# Patient Record
Sex: MALE | Race: WHITE
[De-identification: names, ages, dates, MRNs, and addresses within clinical notes are randomized per-mention and may not be internally consistent; named-entity substitution may affect disease eponyms.]

---

## 2020-07-22 ENCOUNTER — HOSPITAL ENCOUNTER (EMERGENCY)
Dept: HOSPITAL 56 - MW.ED | Age: 85
LOS: 1 days | Discharge: NURSING, REHAB OR LONG-TERM CARE | End: 2020-07-23
Payer: MEDICARE

## 2020-07-22 VITALS — SYSTOLIC BLOOD PRESSURE: 184 MMHG | DIASTOLIC BLOOD PRESSURE: 102 MMHG | HEART RATE: 68 BPM

## 2020-07-22 DIAGNOSIS — Z20.828: ICD-10-CM

## 2020-07-22 DIAGNOSIS — Z88.8: ICD-10-CM

## 2020-07-22 DIAGNOSIS — R00.0: ICD-10-CM

## 2020-07-22 DIAGNOSIS — Z79.899: ICD-10-CM

## 2020-07-22 DIAGNOSIS — E03.9: ICD-10-CM

## 2020-07-22 DIAGNOSIS — F41.9: ICD-10-CM

## 2020-07-22 DIAGNOSIS — R79.89: ICD-10-CM

## 2020-07-22 DIAGNOSIS — K40.90: ICD-10-CM

## 2020-07-22 DIAGNOSIS — Z79.82: ICD-10-CM

## 2020-07-22 DIAGNOSIS — I10: ICD-10-CM

## 2020-07-22 DIAGNOSIS — F32.9: ICD-10-CM

## 2020-07-22 DIAGNOSIS — K56.609: Primary | ICD-10-CM

## 2020-07-22 LAB
BUN SERPL-MCNC: 45 MG/DL (ref 7–18)
CHLORIDE SERPL-SCNC: 102 MMOL/L (ref 98–107)
CO2 SERPL-SCNC: 26.3 MMOL/L (ref 21–32)
GLUCOSE SERPL-MCNC: 158 MG/DL (ref 74–106)
LIPASE SERPL-CCNC: 91 U/L (ref 73–393)
POTASSIUM SERPL-SCNC: 3.1 MMOL/L (ref 3.5–5.1)
SODIUM SERPL-SCNC: 143 MMOL/L (ref 136–148)

## 2020-07-22 PROCEDURE — 93005 ELECTROCARDIOGRAM TRACING: CPT

## 2020-07-22 PROCEDURE — 71045 X-RAY EXAM CHEST 1 VIEW: CPT

## 2020-07-22 PROCEDURE — 99285 EMERGENCY DEPT VISIT HI MDM: CPT

## 2020-07-22 PROCEDURE — U0002 COVID-19 LAB TEST NON-CDC: HCPCS

## 2020-07-22 PROCEDURE — 83690 ASSAY OF LIPASE: CPT

## 2020-07-22 PROCEDURE — 96376 TX/PRO/DX INJ SAME DRUG ADON: CPT

## 2020-07-22 PROCEDURE — 85025 COMPLETE CBC W/AUTO DIFF WBC: CPT

## 2020-07-22 PROCEDURE — 43752 NASAL/OROGASTRIC W/TUBE PLMT: CPT

## 2020-07-22 PROCEDURE — 96375 TX/PRO/DX INJ NEW DRUG ADDON: CPT

## 2020-07-22 PROCEDURE — 81001 URINALYSIS AUTO W/SCOPE: CPT

## 2020-07-22 PROCEDURE — 84484 ASSAY OF TROPONIN QUANT: CPT

## 2020-07-22 PROCEDURE — 80053 COMPREHEN METABOLIC PANEL: CPT

## 2020-07-22 PROCEDURE — 96365 THER/PROPH/DIAG IV INF INIT: CPT

## 2020-07-22 PROCEDURE — 51702 INSERT TEMP BLADDER CATH: CPT

## 2020-07-22 PROCEDURE — 96361 HYDRATE IV INFUSION ADD-ON: CPT

## 2020-07-22 PROCEDURE — 74177 CT ABD & PELVIS W/CONTRAST: CPT

## 2020-07-22 PROCEDURE — 83605 ASSAY OF LACTIC ACID: CPT

## 2020-07-22 NOTE — CR
Indication:



 Intubation 



Technique:



Chest 1 view



Comparison:



July 22, 2020 at 10:09 p.m.



Findings/Impression:



Endotracheal tube tip terminates at the level of the hosea and should be 

retracted. A gastric tube tip terminates at the level of mid stomach. There 

are multiple air-filled loops of colon in the upper abdomen. No free air is 

identified. Stable cardiac size. Status post median sternotomy. Lung 

volumes are low. Calcified granulomata in the right lung. No pneumothorax 

or significant effusion.



Dictated by Karon Ralph MD @ Jul 22 2020 11:04PM



Signed by Dr. Karon Ralph @ Jul 22 2020 11:05PM

## 2020-07-22 NOTE — EDM.PDOC
ED HPI GENERAL MEDICAL PROBLEM





- General


Chief Complaint: Gastrointestinal Problem


Stated Complaint: abdominal distention


Time Seen by Provider: 07/22/20 20:15





- History of Present Illness


INITIAL COMMENTS - FREE TEXT/NARRATIVE: 





History of present illness:


Patient presents with a distended abdomen since yesterday.  Patient comes from a

nursing home we had very little report from the patient is complaining of 

feeling very bloated and uncomfortable with abdominal pain he did have some 

vomiting apparently earlier he also has noted when we get into the room to be 

having runs of V. tach on the monitor he states he has a hernia that had given 

him trouble in the past he has Parkinson's and has dementia and is a difficult 

historian although he is alert oriented to person and place and able to answer 

questions.





Review of systems: 


As per history of present illness and below otherwise all systems reviewed and 

negative.





Past medical history: 


As per history of present illness and as reviewed below otherwise 

noncontributory.





Surgical history: 


As per history of present illness and as reviewed below otherwise 

noncontributory.





Social history: 


No reported history of drug or alcohol abuse.





Family history: 


As per history of present illness and as reviewed below otherwise 

noncontributory.





Physical exam:


HEENT: Atraumatic, normocephalic, pupils reactive, negative for conjunctival 

pallor or scleral icterus, mucous membranes moist, throat clear, neck supple, 

nontender, trachea midline.


Lungs: Clear to auscultation, breath sounds equal bilaterally, chest nontender.


Heart: S1S2, regular, negative for clicks, rubs, or JVD.


Abdomen: Very distended tympanic abdomen consistent with a high-grade bowel 

obstruction.  It is diffusely tender to palpation.. Negative for masses or 

hepatosplenomegaly. Negative for costovertebral tenderness.


Pelvis: Stable nontender.


Genitourinary: There is a right inguinal hernia present


Rectal: Deferred.


Extremities: Atraumatic, negative for cords or calf pain. Neurovascular 

unremarkable.


Neuro: Awake, alert, oriented. Cranial nerves II through XII unremarkable. 

Cerebellum unremarkable. Motor and sensory unremarkable throughout. Exam 

nonfocal.





Diagnostics:


[]





Therapeutics:


[]





Impression: Bowel obstruction, tachydysrhythmia


[]





Plan: Patient will undergo lab studies a CT down and pelvis manipulation of the 

hernia will be attempted medication for pain and stabilization of his ta

chydysrhythmia will be done including a cardiac work-up.


[]





Definitive disposition and diagnosis as appropriate pending reevaluation and 

review of above.





  ** Abdomen


Pain Score (Numeric/FACES): 2





- Related Data


                                    Allergies











Allergy/AdvReac Type Severity Reaction Status Date / Time


 


atorvastatin Allergy  Other Verified 07/22/20 18:44


 


fluticasone [From Flonase] Allergy  Other Verified 07/22/20 18:44


 


fluvastatin Allergy  Other Verified 07/22/20 18:44


 


niacin Allergy  Muscle Verified 07/22/20 18:44





   Weakness  


 


simvastatin Allergy  Chest Pain Verified 07/22/20 18:44


 


tamsulosin Allergy  Other Verified 07/22/20 18:44











Home Meds: 


                                    Home Meds





Latanoprost [Xalatan 0.005% Ophth Soln] 1 drop EYEBOTH BEDTIME 12/22/16 

[History]


Lisinopril 40 mg PO DAILY 12/22/16 [History]


Metoprolol Tartrate 12.5 mg PO BID 12/22/16 [History]


Multivitamin [Multivitamins] 1 each PO DAILY 12/22/16 [History]


Nitroglycerin 9 mg PO Q12HR 12/22/16 [History]


Nitroglycerin [Nitrostat] 0.4 mg SL Q5M PRN MDD 3 TABLETS 12/22/16 [History]


amLODIPine Besylate [Amlodipine Besylate] 5 mg PO DAILY 12/22/16 [History]


Lovastatin 20 mg PO BEDTIME 12/24/16 [History]


Acetaminophen [Pain Reliever] 500 mg PO Q4H PRN 02/17/17 [History]


Aspirin [Ecotrin EC] 81 mg PO DAILY 02/17/17 [History]


Bisacodyl [Biscolax] 10 mg RC Q24H PRN 02/17/17 [History]


Calcium Carbonate/Vitamin D3 [Calcium 600 + Vit D Tablet] 1 tab PO DAILY 

02/17/17 [History]


Carboxymethylcellulose Sodium [Refresh Tears] 1 drop EYEBOTH QID PRN 02/17/17 

[History]


Finasteride 5 mg PO BEDTIME 02/17/17 [History]


Levothyroxine 37.5 mcg PO ACBREAKFAST 02/17/17 [History]


Mag Hydrox/Aluminum Hyd/Simeth [Maalox Maximum Strength Susp] 30 ml PO 

ASDIRECTED PRN 02/17/17 [History]


Magnesium Hydroxide [Milk of Magnesia] 30 ml PO DAILY PRN 02/17/17 [History]


Na Phos,M-B/Na Phos,DI-B [Fleet Enema] 133 ml RC ASDIRECTED PRN 02/17/17 

[History]


Phenyleph/Shark Effie.oil/Mo/Pet [Hemorrhoidal Ointment] 1 applic RC ASDIRECTED 

PRN 02/17/17 [History]


Psyllium Husk [Metamucil] 2 tbsp PO BID 02/17/17 [History]


Sennosides [Senna] 8.6 mg PO BID 02/17/17 [History]


polyethylene glycoL 3350 [Miralax] 17 gm PO DAILY 02/17/17 [History]


witch hazeL [Tucks] 1 each TP ASDIRECTED PRN 02/17/17 [History]











Past Medical History


HEENT History: Reports: Glaucoma


Cardiovascular History: Reports: Hypertension, Stents


Respiratory History: Reports: None


Gastrointestinal History: Reports: Chronic Constipation


Genitourinary History: Reports: Prostate Disorder


Musculoskeletal History: Reports: None


Neurological History: Reports: Other (See Below)


Other Neuro History: Confusion


Psychiatric History: Reports: Anxiety, Depression


Endocrine/Metabolic History: Reports: Hypothyroidism


Hematologic History: Reports: None


Oncologic (Cancer) History: Reports: Prostate


Dermatologic History: Reports: None





- Infectious Disease History


Infectious Disease History: Reports: None





- Past Surgical History


Cardiovascular Surgical History: Reports: Coronary Artery Bypass





Social & Family History





- Family History


Family Medical History: Unobtainable





- Tobacco Use


Smoking Status *Q: Never Smoker


Second Hand Smoke Exposure: No





- Caffeine Use


Caffeine Use: Reports: None





- Recreational Drug Use


Recreational Drug Use: No





ED ROS GENERAL





- Review of Systems


Review Of Systems: See Below





ED EXAM, GENERAL





- Physical Exam


Exam: See Below





EKG INTERPRETATION


EKG Interpretation Comments: 





Normal sinus rhythm rate of 80 bpm wide-complex QRS with ventricular trigeminy 

nonspecific ST-T changes grossly abnormal EKG. there are several areas of 

inconsistent ST segment elevation but no clear reciprocal change





Course





- Vital Signs


Text/Narrative:: 





Patient was initially brought back after being in the waiting room for some time

 on quick examination it was noted when nursing called me and that he was having

 runs of V. tach.  We initiated a bolus dose of amiodarone and this seemed to 

stabilize.  On gross exam it was noted that he had a widely distended abdomen 

orders for pain medication and NG tube were obtained a CT of the abdomen was 

obtained owing a left inguinal hernia as a cause of high-grade bowel 

obstruction.  Patient was soon manipulated and the hernia was palpably reduced 

however the abdomen did not significantly reduce and distention immediately.





I discussed the complications that the patient was having with his sister who is

 power of  as well as with the patient and they would like intervention 

if necessary for both the heart condition and the potential surgical problem 

with his bowel obstruction.





Discussed the case with Dr. Pollack at approximately 10:30 PM I then subsequently 

discussed the case with Dr. Cobb at 10:40 PM and they agree that with these 

complications the patient should be transferred to [facility].





At 11 PM this case with Dr. Foreman at [facility] ED they will accept the patient.  





Critical care 38 minutes for acute bowel obstruction with unstable vital signs 

and abnormal ventricular dysrhythmias.  This included initial assessment 

initiation of antidysrhythmic's also included reassessment of the patient fluid 

resuscitation maintenance fluids reassessment of patient multiple conversations 

with consultants and family members does not include separately billable 

procedures.








Last Recorded V/S: 


                                Last Vital Signs











Temp  37.2 C   07/22/20 18:38


 


Pulse  80   07/22/20 18:38


 


Resp  20   07/22/20 18:38


 


BP  153/91 H  07/22/20 18:38


 


Pulse Ox  94 L  07/22/20 18:38














- Orders/Labs/Meds


Orders: 


                               Active Orders 24 hr











 Category Date Time Status


 


 EKG 12 Lead [EKG Documentation Completion] [RC] STAT Care  07/22/20 19:12 

Active


 


 Gastrointestinal Tube Mgmt [RC] ASDIRECTED Care  07/22/20 20:23 Active


 


 CORONAVIRUS COVID-19 RAPID [MOLEC] Stat Lab  07/22/20 22:39 Received


 


 UA RFX JEFF AND CULT IF INDIC [URIN] Stat Lab  07/22/20 19:11 Ordered


 


 Amiodarone In Dextrose,Iso-Osm [Nexterone in Dextrose Med  07/22/20 23:15 

Active





 360 MG/200 ML]   





 360 mg in 200 ml IV ASDIRECTED   


 


 Aspirin Med  07/22/20 23:15 Active





 300 mg RECTAL DAILY   


 


 Sodium Chloride 0.9% [Normal Saline] 1,000 ml Med  07/22/20 23:15 Active





 IV ASDIRECTED   


 


 Sodium Chloride 0.9% [Saline Flush] Med  07/22/20 19:11 Active





 10 ml FLUSH ASDIRECTED PRN   


 


 Sodium Chloride 0.9% [Saline Flush] [facility]  07/22/20 19:11 Active





 2.5 ml FLUSH ASDIRECTED PRN   


 


 Sodium Chloride 0.9% [Saline Flush] [facility]  07/22/20 19:11 Active





 2.5 ml FLUSH ASDIRECTED PRN   


 


 NG [Nasogastric Orogastric Tube Insertion] [OM.PC] Stat Ot  07/22/20 20:22 

Ordered


 


 Saline Lock Insert [OM.PC] Stat [facility]  07/22/20 19:11 Ordered








                                Medication Orders





Aspirin (Aspirin)  300 mg RECTAL DAILY CRICKET


Amiodarone HCl/Dextrose (Nexterone In Dextrose 360 Mg/200 Ml)  360 mg in 200 mls

 @ 33.333 mls/hr IV ASDIRECTED CRICKET; Protocol


Sodium Chloride (Normal Saline)  1,000 mls @ 100 mls/hr IV ASDIRECTED CRICKET


Sodium Chloride (Saline Flush)  2.5 ml FLUSH ASDIRECTED PRN


   PRN Reason: Keep Vein Open


Sodium Chloride (Saline Flush)  10 ml FLUSH ASDIRECTED PRN


   PRN Reason: Keep Vein Open


Sodium Chloride (Saline Flush)  2.5 ml FLUSH ASDIRECTED PRN


   PRN Reason: Keep Vein Open








Labs: 


                                Laboratory Tests











  07/22/20 07/22/20 07/22/20 Range/Units





  19:50 19:50 22:52 


 


WBC  11.20 H    (4.0-11.0)  K/uL


 


RBC  4.45 L    (4.50-5.90)  M/uL


 


Hgb  13.7    (13.0-17.0)  g/dL


 


Hct  41.0    (38.0-50.0)  %


 


MCV  92.1    (80.0-98.0)  fL


 


MCH  30.8    (27.0-32.0)  pg


 


MCHC  33.4    (31.0-37.0)  g/dL


 


RDW Std Deviation  43.2    (28.0-62.0)  fl


 


RDW Coeff of Yinka  13    (11.0-15.0)  %


 


Plt Count  162    (150-400)  K/uL


 


MPV  10.80    (7.40-12.00)  fL


 


Neut % (Auto)  87.1 H    (48.0-80.0)  %


 


Lymph % (Auto)  6.1 L    (16.0-40.0)  %


 


Mono % (Auto)  6.8    (0.0-15.0)  %


 


Eos % (Auto)  0.0    (0.0-7.0)  %


 


Baso % (Auto)  0.0    (0.0-1.5)  %


 


Neut # (Auto)  9.8 H    (1.4-5.7)  K/uL


 


Lymph # (Auto)  0.7    (0.6-2.4)  K/uL


 


Mono # (Auto)  0.8    (0.0-0.8)  K/uL


 


Eos # (Auto)  0.0    (0.0-0.7)  K/uL


 


Baso # (Auto)  0.0    (0.0-0.1)  K/uL


 


Nucleated RBC %  0.0    /100WBC


 


Nucleated RBCs #  0    K/uL


 


Lactate    1.9  (0.20-2.00)  mmol/L


 


Sodium   143   (136-148)  mmol/L


 


Potassium   3.1 L   (3.5-5.1)  mmol/L


 


Chloride   102   ()  mmol/L


 


Carbon Dioxide   26.3   (21.0-32.0)  mmol/L


 


BUN   45 H   (7.0-18.0)  mg/dL


 


Creatinine   1.1   (0.8-1.3)  mg/dL


 


Est Cr Clr Drug Dosing   38.30   mL/min


 


Estimated GFR (MDRD)   > 60.0   ml/min


 


Glucose   158 H   ()  mg/dL


 


Calcium   11.7 H   (8.5-10.1)  mg/dL


 


Total Bilirubin   1.8 H   (0.2-1.0)  mg/dL


 


AST   39 H   (15-37)  IU/L


 


ALT   28   (14-63)  IU/L


 


Alkaline Phosphatase   94   ()  U/L


 


Troponin I   0.154 H*   (0.000-0.056)  ng/mL


 


Total Protein   8.1   (6.4-8.2)  g/dL


 


Albumin   5.0   (3.4-5.0)  g/dL


 


Globulin   3.1   (2.6-4.0)  g/dL


 


Albumin/Globulin Ratio   1.6   (0.9-1.6)  


 


Lipase   91   ()  U/L











Meds: 


Medications











Generic Name Dose Route Start Last Admin





  Trade Name Freq  PRN Reason Stop Dose Admin


 


Aspirin  300 mg  07/22/20 23:15 





  Aspirin  RECTAL  





  DAILY CRICKET  


 


Amiodarone HCl/Dextrose  360 mg in 200 mls @ 33.333 mls/hr  07/22/20 23:15 





  Nexterone In Dextrose 360 Mg/200 Ml  IV  





  ASDIRECTED CRICKET  





  Protocol  





  1 MG/MIN  


 


Sodium Chloride  1,000 mls @ 100 mls/hr  07/22/20 23:15 





  Normal Saline  IV  





  ASDIRECTED CRICKET  


 


Sodium Chloride  2.5 ml  07/22/20 19:11 





  Saline Flush  FLUSH  





  ASDIRECTED PRN  





  Keep Vein Open  


 


Sodium Chloride  10 ml  07/22/20 19:11 





  Saline Flush  FLUSH  





  ASDIRECTED PRN  





  Keep Vein Open  


 


Sodium Chloride  2.5 ml  07/22/20 19:11 





  Saline Flush  FLUSH  





  ASDIRECTED PRN  





  Keep Vein Open  














Discontinued Medications














Generic Name Dose Route Start Last Admin





  Trade Name Suzette  PRN Reason Stop Dose Admin


 


Aspirin  Confirm  07/22/20 23:11 





  Aspirin  Administered  07/22/20 23:12 





  Dose  





  300 mg  





  .ROUTE  





  .STK-MED ONE  


 


Benzocaine  Confirm  07/22/20 21:42 





  Hurricaine One 20%  Administered  07/22/20 21:43 





  Dose  





  2 each  





  MUCMEM  





  .STK-MED ONE  


 


Sodium Chloride  1,000 mls @ 999 mls/hr  07/22/20 19:11  07/22/20 20:09





  Normal Saline  IV  07/22/20 20:11  999 mls/hr





  BOLUS ONE   Administration


 


Amiodarone HCl 150 mg/  103 mls @ 600 mls/hr  07/22/20 20:19 





  Dextrose/Water  IV  07/22/20 20:29 





  .BOLUS ONE  


 


Iopamidol  100 ml  07/22/20 21:44  07/22/20 21:45





  Isovue-370 (76%)  IVPUSH  07/22/20 21:45  100 ml





  ONETIME STA   Administration


 


Lidocaine HCl  10 ml  07/22/20 20:24 





  Xylocaine 4% Top Soln  MUCMEM  07/22/20 20:25 





  ONETIME ONE  


 


Lidocaine HCl  Confirm  07/22/20 21:49 





  Xylocaine 2% Viscous  Administered  07/22/20 21:50 





  Dose  





  15 ml  





  .ROUTE  





  .STK-MED ONE  


 


Morphine Sulfate  4 mg  07/22/20 20:22  07/22/20 20:37





  Morphine  IVPUSH  07/22/20 20:23  4 mg





  ONETIME ONE   Administration


 


Morphine Sulfate  Confirm  07/22/20 20:23  07/22/20 20:37





  Morphine  Administered  07/22/20 20:24  Not Given





  Dose  





  4 mg  





  .ROUTE  





  .STK-MED ONE  


 


Ondansetron HCl  4 mg  07/22/20 19:13  07/22/20 20:09





  Zofran  IVPUSH  07/22/20 19:14  4 mg





  ONETIME ONE   Administration














Departure





- Departure


Time of Disposition: 23:21


Disposition: DC[facility] to [facility]


Condition: Fair


Clinical Impression: 


 Bowel obstruction, Inguinal hernia, Ventricular tachycardia, Elevated troponin








- Discharge Information


*PRESCRIPTION DRUG MONITORING PROGRAM REVIEWED*: Not Applicable


*COPY OF PRESCRIPTION DRUG MONITORING REPORT IN PATIENT [place]N: Not Applicable


Instructions:  Inguinal Hernia, Adult, Easy-to-Read, Bowel Obstruction, 

Easy-to-Read


Referrals: 


PCP,None [Primary Care Provider] - 


Forms:  ED Department Discharge





Sepsis Event Note (ED)





- Evaluation


Sepsis Screening Result: No Definite Risk





- Focused Exam


Vital Signs: 


                                   Vital Signs











  Temp Pulse Resp BP Pulse Ox


 


 07/22/20 18:38  37.2 C  80  20  153/91 H  94 L














- My Orders


Last 24 Hours: 


My Active Orders





07/22/20 19:11


UA RFX JEFF AND CULT IF INDIC [URIN] Stat 


Sodium Chloride 0.9% [Saline Flush]   10 ml FLUSH ASDIRECTED PRN 


Sodium Chloride 0.9% [Saline Flush]   2.5 ml FLUSH ASDIRECTED PRN 


Sodium Chloride 0.9% [Saline Flush]   2.5 ml FLUSH ASDIRECTED PRN 


Saline Lock Insert [OM.PC] Stat 





07/22/20 19:12


EKG 12 Lead [EKG Documentation Completion] [RC] STAT 





07/22/20 20:22


NG [Nasogastric Orogastric Tube Insertion] [OM.PC] Stat 





07/22/20 20:23


Gastrointestinal Tube Mgmt [RC] ASDIRECTED 





07/22/20 22:39


CORONAVIRUS COVID-19 RAPID [MOLEC] Stat 





07/22/20 23:15


Amiodarone In Dextrose,Iso-Osm [Nexterone in Dextrose 360 MG/200 ML] 360 mg in 

200 ml IV ASDIRECTED 


Aspirin   300 mg RECTAL DAILY 


Sodium Chloride 0.9% [Normal Saline] 1,000 ml IV ASDIRECTED 














- Assessment/Plan


Last 24 Hours: 


My Active Orders





07/22/20 19:11


UA RFX JEFF AND CULT IF INDIC [URIN] Stat 


Sodium Chloride 0.9% [Saline Flush]   10 ml FLUSH ASDIRECTED PRN 


Sodium Chloride 0.9% [Saline Flush]   2.5 ml FLUSH ASDIRECTED PRN 


Sodium Chloride 0.9% [Saline Flush]   2.5 ml FLUSH ASDIRECTED PRN 


Saline Lock Insert [OM.PC] Stat 





07/22/20 19:12


EKG 12 Lead [EKG Documentation Completion] [RC] STAT 





07/22/20 20:22


NG [Nasogastric Orogastric Tube Insertion] [OM.PC] Stat 





07/22/20 20:23


Gastrointestinal Tube Mgmt [RC] ASDIRECTED 





07/22/20 22:39


CORONAVIRUS COVID-19 RAPID [MOLEC] Stat 





07/22/20 23:15


Amiodarone In Dextrose,Iso-Osm [Nexterone in Dextrose 360 MG/200 ML] 360 mg in 

200 ml IV ASDIRECTED 


Aspirin   300 mg RECTAL DAILY 


Sodium Chloride 0.9% [Normal Saline] 1,000 ml IV ASDIRECTED

## 2020-07-22 NOTE — CT
INDICATION:



Abdominal pain. 



COMPARISON:



None available 



TECHNIQUE:



CT examination of the abdomen and pelvis was performed with the uneventful 

intravenous administration of 100 cc of Isovue 370 while 3 mm thick axial 

sections were obtained from the lung bases through the pubic symphysis. 

Oral contrast was not administered.  



Please note that all CT scans at this facility use dose modulation, 

iterative reconstruction, and/or weight-based dosing when appropriate to 

reduce radiation dose to as low as reasonably achievable. 



FINDINGS:



There is massive dilatation of the colon extending to a moderate sized left 

inguinal hernia containing a nondistended loop of sigmoid colon. The colon 

proximal to the hernia is prominently distended, with the sigmoid colon 

distal to the hernia collapsed. The findings are that high grade distal 

colonic obstruction by the hernia. 



There is no sign of free air or free fluid in the abdomen or pelvis to 

suggest perforation. 



Interestingly, the small bowel is nondistended. 



There is also a small right inguinal hernia containing fluid and fat, but 

no loops of bowel. 



In the abdomen, the liver has a 1.4 centimeter anterior subcapsular cyst in 

the inferior lateral segment of the left lobe, segment 3. The rest of the 

liver is normal in appearance.



The spleen, pancreas, and adrenals are normal in appearance. 



A few small cysts are present in both kidneys. The kidneys are otherwise 

normal in appearance. 



The gallbladder appears to be absent, consistent with cholecystectomy. 



The abdominal aorta is normal in caliber with no sign of dilatation. There 

is no sign of retroperitoneal mass or adenopathy. 



The stomach, loops of small bowel, and colon in the abdomen are normal in 

appearance. 



In the pelvis, the appendix is nonvisualized, but there is no sign of an 

inflammatory process in the area of the appendix.



The loops of small bowel in the pelvis are normal in appearance. 



The prostate is normal in appearance. 



The urinary bladder is normal in appearance. 



There is no sign of pelvic or inguinal mass or adenopathy. 



There is moderate consolidation of the posterior lung bases associated with 

moderate eventration of both hemidiaphragms. 



There is a densely calcified granuloma in the anterior-medial subpleural 

right middle lobe at the lung base.



The heart is mildly enlarged. Sternal wires are present from median 

sternotomy. There is heavy triple-vessel coronary calcification. 



There is mild scoliosis of the lumbar spine convex towards the left.



There is mild anterior wedging of T11 through L1, mild compression 

fractures versus anatomic variant. There is moderate L4-5 and prominent 

L5-S1 disc degenerative disease. There is moderate L3-4 disc degenerative 

disease as well. There is moderate disc degenerative disease throughout the 

inferior thoracic spine. 



IMPRESSION:



Findings of high-grade distal colonic obstruction produced by a 

moderate-sized left inguinal hernia containing a nondistended loop of 

sigmoid colon. The distal sigmoid colon is nondistended. 



Interestingly, the small bowel is nondistended.



CT of the abdomen shows absence of the gallbladder consistent with 

cholecystectomy.



CT of the pelvis shows a small right inguinal hernia containing fat and 

fluid with no loops of bowel. 



Moderate posterior basilar atelectasis associated with moderate eventration 

of both hemidiaphragms. 



Mild cardiomegaly.



Please note that all CT scans at this facility use dose modulation, 

iterative reconstruction, and/or weight-based dosing when appropriate to 

reduce radiation dose to as low as reasonably achievable.



Dictated by Marshal Armstrong MD @ Jul 22 2020 10:18PM



Signed by Dr. Marshal Armstrong @ Jul 22 2020 10:29PM

## 2020-07-22 NOTE — CR
INDICATION:



NG tube placement



TECHNIQUE:



Chest 2 views.



COMPARISON:



None 



FINDINGS:



The heart is normal in size. The pulmonary vasculature is within normal 

limits. An enteric tube tip projects over the right mainstem bronchus. 

There are postsurgical changes of median sternotomy. Marked dilated loops 

of bowel are seen within the partially visualized upper abdomen. 



IMPRESSION:



Enteric tube within the right mainstem bronchus. Marked dilated loops of 

bowel partially visualized within the upper abdomen. 



Findings were discussed with Dr. Cotton at 10:35 p.m.  on 07/22/2020. The 

enteric tube has already been removed.



Dictated by Alicia Woodward MD @ 7/22/2020 10:35:57 PM



Dictated by: Alicia Woodward MD @ 07/22/2020 22:37:30



(Electronically Signed)

## 2020-09-24 ENCOUNTER — HOSPITAL ENCOUNTER (EMERGENCY)
Dept: HOSPITAL 56 - MW.ED | Age: 85
LOS: 1 days | Discharge: NURSING, REHAB OR LONG-TERM CARE | End: 2020-09-25
Payer: MEDICARE

## 2020-09-24 DIAGNOSIS — Z20.828: ICD-10-CM

## 2020-09-24 DIAGNOSIS — K56.2: Primary | ICD-10-CM

## 2020-09-24 DIAGNOSIS — Z79.899: ICD-10-CM

## 2020-09-24 DIAGNOSIS — Z88.8: ICD-10-CM

## 2020-09-24 DIAGNOSIS — Z79.02: ICD-10-CM

## 2020-09-24 DIAGNOSIS — I10: ICD-10-CM

## 2020-09-24 DIAGNOSIS — Z79.82: ICD-10-CM

## 2020-09-24 DIAGNOSIS — E03.9: ICD-10-CM

## 2020-09-24 LAB
BUN SERPL-MCNC: 26 MG/DL (ref 7–18)
CHLORIDE SERPL-SCNC: 103 MMOL/L (ref 98–107)
CO2 SERPL-SCNC: 27.4 MMOL/L (ref 21–32)
GLUCOSE SERPL-MCNC: 124 MG/DL (ref 74–106)
LIPASE SERPL-CCNC: 98 U/L (ref 73–393)
POTASSIUM SERPL-SCNC: 3.2 MMOL/L (ref 3.5–5.1)
SODIUM SERPL-SCNC: 140 MMOL/L (ref 136–148)

## 2020-09-24 PROCEDURE — 96374 THER/PROPH/DIAG INJ IV PUSH: CPT

## 2020-09-24 PROCEDURE — 74177 CT ABD & PELVIS W/CONTRAST: CPT

## 2020-09-24 PROCEDURE — 83690 ASSAY OF LIPASE: CPT

## 2020-09-24 PROCEDURE — 83605 ASSAY OF LACTIC ACID: CPT

## 2020-09-24 PROCEDURE — 83735 ASSAY OF MAGNESIUM: CPT

## 2020-09-24 PROCEDURE — 99284 EMERGENCY DEPT VISIT MOD MDM: CPT

## 2020-09-24 PROCEDURE — 85025 COMPLETE CBC W/AUTO DIFF WBC: CPT

## 2020-09-24 PROCEDURE — 80053 COMPREHEN METABOLIC PANEL: CPT

## 2020-09-24 PROCEDURE — U0002 COVID-19 LAB TEST NON-CDC: HCPCS

## 2020-09-24 PROCEDURE — 36415 COLL VENOUS BLD VENIPUNCTURE: CPT

## 2020-09-24 PROCEDURE — 96375 TX/PRO/DX INJ NEW DRUG ADDON: CPT

## 2020-09-24 NOTE — EDM.PDOC
ED HPI GENERAL MEDICAL PROBLEM





- General


Chief Complaint: Abdominal Pain


Stated Complaint: REFER FROM JARET


Time Seen by Provider: 09/24/20 20:51





- History of Present Illness


INITIAL COMMENTS - FREE TEXT/NARRATIVE: 





85-year-old male presenting with abdominal pain and distention.  The patient 

does have a history of a right inguinal hernia that led to a large bowel 

obstruction in July of this year.  At that time the patient also had runs of 

ventricular tachycardia and he was loaded with amiodarone and transferred to 

[facility].  His sister and POA is at the bedside and she reports 

that they did a colonoscopy but that he did not have an abdominal surgery.  On 

chart review it appears that the inguinal hernia was reduced in the ER prior to 

transfer.





Patient is now complaining of abdominal pain he has significant dementia so 

difficult to determine how long this is been going on but his sister reports 

that when she last talked to him 2 days ago everything seemed fine.  He denies 

fever or complaints he denies exacerbating or alleviating factors or other 

associated symptoms.


  ** abdominal


Pain Score (Numeric/FACES): 5





- Related Data


                                    Allergies











Allergy/AdvReac Type Severity Reaction Status Date / Time


 


atorvastatin Allergy  Other Verified 07/22/20 18:44


 


fluticasone [From Flonase] Allergy  Other Verified 07/22/20 18:44


 


fluvastatin Allergy  Other Verified 07/22/20 18:44


 


niacin Allergy  Muscle Verified 07/22/20 18:44





   Weakness  


 


simvastatin Allergy  Chest Pain Verified 07/22/20 18:44


 


tamsulosin Allergy  Other Verified 07/22/20 18:44











Home Meds: 


                                    Home Meds





Latanoprost [Xalatan 0.005% Ophth Soln] 1 drop EYEBOTH BEDTIME 12/22/16 

[History]


Lisinopril 40 mg PO DAILY 12/22/16 [History]


Metoprolol Tartrate 12.5 mg PO BID 12/22/16 [History]


Multivitamin [Multivitamins] 1 each PO DAILY 12/22/16 [History]


Nitroglycerin 9 mg PO Q12HR 12/22/16 [History]


Nitroglycerin [Nitrostat] 0.4 mg SL Q5M PRN MDD 3 TABLETS 12/22/16 [History]


amLODIPine Besylate [Amlodipine Besylate] 5 mg PO DAILY 12/22/16 [History]


Lovastatin 40 mg PO BEDTIME 12/24/16 [History]


Acetaminophen [Pain Reliever] 500 mg PO Q4H PRN 02/17/17 [History]


Aspirin [Ecotrin EC] 81 mg PO DAILY 02/17/17 [History]


Bisacodyl [Biscolax] 10 mg RC Q24H PRN 02/17/17 [History]


Calcium Carbonate/Vitamin D3 [Calcium 600 + Vit D Tablet] 1 tab PO DAILY 

02/17/17 [History]


Carboxymethylcellulose Sodium [Refresh Tears] 1 drop EYEBOTH QID PRN 02/17/17 

[History]


Finasteride 5 mg PO BEDTIME 02/17/17 [History]


Levothyroxine 37.5 mcg PO ACBREAKFAST 02/17/17 [History]


Mag Hydrox/Aluminum Hyd/Simeth [Maalox Maximum Strength Susp] 30 ml PO 

ASDIRECTED PRN 02/17/17 [History]


Magnesium Hydroxide [Milk of Magnesia] 30 ml PO DAILY PRN 02/17/17 [History]


Na Phos,M-B/Na Phos,DI-B [Fleet Enema] 133 ml RC ASDIRECTED PRN 02/17/17 

[History]


Psyllium Husk [Metamucil] 2 tbsp PO BID 02/17/17 [History]


polyethylene glycoL 3350 [Miralax] 17 gm PO DAILY 02/17/17 [History]


witch hazeL [Tucks] 1 each TP ASDIRECTED PRN 02/17/17 [History]


Carbamide Peroxide [Debrox] 1 drop EARBOTH ASDIRECTED PRN 09/24/20 [History]


Clopidogrel Bisulfate [Plavix] 75 mg PO DAILY 09/24/20 [History]


Donepezil HCl 10 mg PO DAILY 09/24/20 [History]


Loperamide [Imodium] 2 mg PO ASDIRECTED PRN 09/24/20 [History]


Non-Formulary Medication [NF Drug]  09/24/20 [History]











Past Medical History


HEENT History: Reports: Glaucoma


Cardiovascular History: Reports: Hypertension, Stents


Respiratory History: Reports: None


Gastrointestinal History: Reports: Chronic Constipation, Other (See Below)


Other Gastrointestinal History: bowel obstruction


Genitourinary History: Reports: Prostate Disorder


Musculoskeletal History: Reports: None


Neurological History: Reports: Other (See Below)


Other Neuro History: Confusion


Psychiatric History: Reports: Anxiety, Depression


Endocrine/Metabolic History: Reports: Hypothyroidism


Hematologic History: Reports: None


Oncologic (Cancer) History: Reports: Prostate


Dermatologic History: Reports: None





- Infectious Disease History


Infectious Disease History: Reports: Chicken Pox, Measles, Mumps





- Past Surgical History


Cardiovascular Surgical History: Reports: Coronary Artery Bypass





Social & Family History





- Family History


Family Medical History: Unobtainable





- Tobacco Use


Smoking Status *Q: Never Smoker





- Caffeine Use


Caffeine Use: Reports: None





- Recreational Drug Use


Recreational Drug Use: No





ED ROS GENERAL





- Review of Systems


Review Of Systems: See Below


Free Text/Narrative/Comment: 


General: No fever.


Skin: No rash.


Eyes: No vision problems.


ENT: No sore throat.


Neck: No neck stiffness.


Respiratory: No shortness of breath.


Cardiac: No chest pain.


Gastrointestinal: Per HPI


Urinary: No dysuria.


Musculoskeletal: No myalgias/arthralgias.


Neurologic: No headache.





ED EXAM, GENERAL





- Physical Exam


Exam: See Below


Free Text/Narrative:: 


General Appearance: No acute distress, appears comfortable and nontoxic


Skin: No rash


HEENT: Normocephalic/atraumatic, sclera anicteric, mucous membranes moist


Neck: Normal range of motion


Chest and Lungs: Bilateral breath sounds, clear to auscultation


Cardiovascular: Regular rate and rhythm, no murmur


Abdomen: Abdomen is tender in the periumbilical region in the bilateral lower 

quadrants as well as somewhat distended


Back: Normal


Musculoskeletal: Trace pitting edema to the ankles bilaterally edema or 

tenderness


Neurologic: Awake, alert, no obvious deficits, moving all extremities


Psychiatric: Appropriate, cooperative





Course





- Vital Signs


Last Recorded V/S: 


                                Last Vital Signs











Temp  98.7 F   09/25/20 01:01


 


Pulse  69   09/25/20 01:01


 


Resp  20   09/25/20 01:01


 


BP  145/72 H  09/25/20 01:01


 


Pulse Ox  94 L  09/25/20 01:01














- Orders/Labs/Meds


Orders: 


                               Active Orders 24 hr











 Category Date Time Status


 


 CORONAVIRUS COVID-19 PCR [facility] Stat Lab  09/24/20 23:51 Received


 


 Sodium Chloride 0.9% [Saline Flush] Med  09/24/20 20:50 Active





 10 ml FLUSH ASDIRECTED PRN   


 


 Sodium Chloride 0.9% [Saline Flush] Med  09/24/20 20:50 Active





 2.5 ml FLUSH ASDIRECTED PRN   


 


 Saline Lock Insert [OM.PC] Stat Oth  09/24/20 20:50 Ordered








                                Medication Orders





Sodium Chloride (Saline Flush)  10 ml FLUSH ASDIRECTED PRN


   PRN Reason: Keep Vein Open


Sodium Chloride (Saline Flush)  2.5 ml FLUSH ASDIRECTED PRN


   PRN Reason: Keep Vein Open








Labs: 


                                Laboratory Tests











  09/24/20 09/24/20 09/24/20 Range/Units





  21:11 21:11 21:11 


 


WBC  9.41    (4.0-11.0)  K/uL


 


RBC  4.12 L    (4.50-5.90)  M/uL


 


Hgb  12.6 L    (13.0-17.0)  g/dL


 


Hct  38.1    (38.0-50.0)  %


 


MCV  92.5    (80.0-98.0)  fL


 


MCH  30.6    (27.0-32.0)  pg


 


MCHC  33.1    (31.0-37.0)  g/dL


 


RDW Std Deviation  42.9    (28.0-62.0)  fl


 


RDW Coeff of Yinka  13    (11.0-15.0)  %


 


Plt Count  130 L    (150-400)  K/uL


 


MPV  10.00    (7.40-12.00)  fL


 


Neut % (Auto)  88.4 H    (48.0-80.0)  %


 


Lymph % (Auto)  6.7 L    (16.0-40.0)  %


 


Mono % (Auto)  4.6    (0.0-15.0)  %


 


Eos % (Auto)  0.2    (0.0-7.0)  %


 


Baso % (Auto)  0.1    (0.0-1.5)  %


 


Neut # (Auto)  8.3 H    (1.4-5.7)  K/uL


 


Lymph # (Auto)  0.6    (0.6-2.4)  K/uL


 


Mono # (Auto)  0.4    (0.0-0.8)  K/uL


 


Eos # (Auto)  0.0    (0.0-0.7)  K/uL


 


Baso # (Auto)  0.0    (0.0-0.1)  K/uL


 


Nucleated RBC %  0.0    /100WBC


 


Nucleated RBCs #  0    K/uL


 


Lactate   1.4   (0.20-2.00)  mmol/L


 


Sodium    140  (136-148)  mmol/L


 


Potassium    3.2 L  (3.5-5.1)  mmol/L


 


Chloride    103  ()  mmol/L


 


Carbon Dioxide    27.4  (21.0-32.0)  mmol/L


 


BUN    26 H  (7.0-18.0)  mg/dL


 


Creatinine    0.9  (0.8-1.3)  mg/dL


 


Est Cr Clr Drug Dosing    TNP  


 


Estimated GFR (MDRD)    > 60.0  ml/min


 


Glucose    124 H  ()  mg/dL


 


Calcium    8.9  (8.5-10.1)  mg/dL


 


Magnesium    1.9  (1.8-2.4)  mg/dL


 


Total Bilirubin    1.5 H  (0.2-1.0)  mg/dL


 


AST    28  (15-37)  IU/L


 


ALT    38  (14-63)  IU/L


 


Alkaline Phosphatase    85  ()  U/L


 


Total Protein    6.7  (6.4-8.2)  g/dL


 


Albumin    4.2  (3.4-5.0)  g/dL


 


Globulin    2.5 L  (2.6-4.0)  g/dL


 


Albumin/Globulin Ratio    1.7 H  (0.9-1.6)  


 


Lipase    98  ()  U/L


 


SARS CoV-2 RNA Rapid RICKY     (NEGATIVE)  














  09/24/20 Range/Units





  23:51 


 


WBC   (4.0-11.0)  K/uL


 


RBC   (4.50-5.90)  M/uL


 


Hgb   (13.0-17.0)  g/dL


 


Hct   (38.0-50.0)  %


 


MCV   (80.0-98.0)  fL


 


MCH   (27.0-32.0)  pg


 


MCHC   (31.0-37.0)  g/dL


 


RDW Std Deviation   (28.0-62.0)  fl


 


RDW Coeff of Yinka   (11.0-15.0)  %


 


Plt Count   (150-400)  K/uL


 


MPV   (7.40-12.00)  fL


 


Neut % (Auto)   (48.0-80.0)  %


 


Lymph % (Auto)   (16.0-40.0)  %


 


Mono % (Auto)   (0.0-15.0)  %


 


Eos % (Auto)   (0.0-7.0)  %


 


Baso % (Auto)   (0.0-1.5)  %


 


Neut # (Auto)   (1.4-5.7)  K/uL


 


Lymph # (Auto)   (0.6-2.4)  K/uL


 


Mono # (Auto)   (0.0-0.8)  K/uL


 


Eos # (Auto)   (0.0-0.7)  K/uL


 


Baso # (Auto)   (0.0-0.1)  K/uL


 


Nucleated RBC %   /100WBC


 


Nucleated RBCs #   K/uL


 


Lactate   (0.20-2.00)  mmol/L


 


Sodium   (136-148)  mmol/L


 


Potassium   (3.5-5.1)  mmol/L


 


Chloride   ()  mmol/L


 


Carbon Dioxide   (21.0-32.0)  mmol/L


 


BUN   (7.0-18.0)  mg/dL


 


Creatinine   (0.8-1.3)  mg/dL


 


Est Cr Clr Drug Dosing   


 


Estimated GFR (MDRD)   ml/min


 


Glucose   ()  mg/dL


 


Calcium   (8.5-10.1)  mg/dL


 


Magnesium   (1.8-2.4)  mg/dL


 


Total Bilirubin   (0.2-1.0)  mg/dL


 


AST   (15-37)  IU/L


 


ALT   (14-63)  IU/L


 


Alkaline Phosphatase   ()  U/L


 


Total Protein   (6.4-8.2)  g/dL


 


Albumin   (3.4-5.0)  g/dL


 


Globulin   (2.6-4.0)  g/dL


 


Albumin/Globulin Ratio   (0.9-1.6)  


 


Lipase   ()  U/L


 


SARS CoV-2 RNA Rapid RIKCY  NEGATIVE  (NEGATIVE)  











Meds: 


Medications











Generic Name Dose Route Start Last Admin





  Trade Name Freq  PRN Reason Stop Dose Admin


 


Sodium Chloride  10 ml  09/24/20 20:50 





  Saline Flush  FLUSH  





  ASDIRECTED PRN  





  Keep Vein Open  


 


Sodium Chloride  2.5 ml  09/24/20 20:50 





  Saline Flush  FLUSH  





  ASDIRECTED PRN  





  Keep Vein Open  














Discontinued Medications














Generic Name Dose Route Start Last Admin





  Trade Name Freq  PRN Reason Stop Dose Admin


 


Iopamidol  100 ml  09/24/20 23:19  09/24/20 23:21





  Isovue Multipack-370 (76%)  IVPUSH  09/24/20 23:20  100 ml





  ONETIME STA   Administration


 


Morphine Sulfate  4 mg  09/24/20 20:50  09/24/20 21:17





  Morphine  IM  09/24/20 20:51  Not Given





  ONETIME ONE  


 


Morphine Sulfate  4 mg  09/24/20 21:16  09/24/20 21:18





  Morphine  IVPUSH  09/24/20 21:17  4 mg





  ONETIME ONE   Administration


 


Ondansetron HCl  4 mg  09/24/20 20:50  09/24/20 21:12





  Zofran  IVPUSH  09/24/20 20:51  4 mg





  ONETIME ONE   Administration














Departure





- Departure


Time of Disposition: 00:09


Disposition: DC/Tfer to [facility]


Condition: Good


Clinical Impression: 


 Sigmoid volvulus








- Discharge Information


*PRESCRIPTION DRUG MONITORING PROGRAM REVIEWED*: Not Applicable


*COPY OF PRESCRIPTION DRUG MONITORING REPORT IN PATIENT [facility]N: Not Applicable


Referrals: 


Jaret Lemons [Primary Care Provider] - 


Forms:  ED Department Discharge





Sepsis Event Note (ED)





- Evaluation


Sepsis Screening Result: No Definite Risk





- Focused Exam


Vital Signs: 


                                   Vital Signs











  Temp Pulse Resp BP Pulse Ox


 


 09/25/20 01:01  98.7 F  69  20  145/72 H  94 L


 


 09/25/20 00:34   80  16  134/75  92 L


 


 09/25/20 00:04   78   145/73 H  94 L


 


 09/24/20 23:26   65   146/77 H  95


 


 09/24/20 22:06   74  20  150/71 H  96


 


 09/24/20 21:30   59 L  18  138/73  96


 


 09/24/20 21:16   59 L  20  138/73  94 L


 


 09/24/20 20:50  98.4 F  69  20  178/92 H  95














- My Orders


Last 24 Hours: 


My Active Orders





09/24/20 20:50


Sodium Chloride 0.9% [Saline Flush]   10 ml FLUSH ASDIRECTED PRN 


Sodium Chloride 0.9% [Saline Flush]   2.5 ml FLUSH ASDIRECTED PRN 


Saline Lock Insert [OM.PC] Stat 





09/24/20 23:51


CORONAVIRUS COVID-19 PCR PHL Stat 














- Assessment/Plan


Last 24 Hours: 


My Active Orders





09/24/20 20:50


Sodium Chloride 0.9% [Saline Flush]   10 ml FLUSH ASDIRECTED PRN 


Sodium Chloride 0.9% [Saline Flush]   2.5 ml FLUSH ASDIRECTED PRN 


Saline Lock Insert [OM.PC] Stat 





09/24/20 23:51


CORONAVIRUS COVID-19 PCR PHL Stat 











Assessment:: 


85-year-old male presenting with signs and symptoms concerning for recurrent 

large bowel obstruction labs including lactic acid pending CT ordered if there 

is a inguinal hernia will attempt reduction.





2132: Patient's right inguinal hernia was easily and completely reduced at the 

bedside other abdominal distention and exam unchanged continue to await labs and

 CT





2345:  Pt discussed with Dr. Ledesma of radiology.  Pt with severe colonic 

obstruction 2/2 sigmoid volvulus.  He does have the left inguinal hernia that 

has mostly small bowel.  However, that is not causing any obstruction or other 

acute issue at this time.  Given the need for urgent / emergent colonoscopy for 

reduction of sigmoid volvulus and our inability to provide that service here 

(per nursing and house supervisor) will investigate the possibility of transfer 

to [facility].





Pt discussed with Dr. Armas in the [facility] ED and patient accepted for transfer.

 Pt is felt stable for ground transfer. I as unable to talk to the surgeon at 

that time.  However, the transfer center confirmed that we could proceed with 

transfer.

## 2020-09-24 NOTE — CT
INDICATION: Abdominal distension



TECHNIQUE: CT Abdomen and pelvis with i.v. contrast. Coronal and sagittal 

reformats were obtained.



CONTRAST: 100 mL Isovue 370



COMPARISON: 07/22/2020



FINDINGS: 



Lower chest: Discoid atelectasis is present in the left lower lobe. Severe 

atherosclerotic calcifications are noted in the coronary arteries. 



Liver: There is a 1.6 cm cyst present in the left liver dome. 



Spleen: Unremarkable. 



Pancreas: Unremarkable. 



Gallbladder: Unremarkable. 



Kidney: Multiple bilateral cortical renal cysts are present measuring up to 

1.2 cm. There is a exophytic lesion of indeterminate density seen in the 

upper pole of the left kidney measuring 1 cm without interval change. 



Adrenal: Unremarkable. 



Bowel: Severe diffuse colonic distention is present measuring 7.4 cm 

extending to a sigmoid volvulus within the pelvis, best seen on images 

. There is a moderate-sized left indirect inguinal hernia present 

containing multiple loops of small bowel and a small amount of ascites, 

similar to prior exam. The appendix is not identified. A small 

periumbilical hernia is present containing a small amount of ascites. 



Vascular: Unremarkable. 



Lymph: Unremarkable. 



Peritoneum: Unremarkable. No pneumoperitoneum is seen. A small right 

inguinal hernia containing ascites is noted. 



Pelvis: Unremarkable. 



Soft tissue: Unremarkable. 



Bone: Unremarkable for age. 



IMPRESSIONS: 



1. Severe diffuse colonic distention is present measuring 7.4 cm extending 

to a sigmoid volvulus within the pelvis, best seen on images .



2. There is a moderate-sized left indirect inguinal hernia present 

containing multiple loops of small bowel and a small amount of ascites, 

similar to prior exam. No small bowel obstruction is identified.



3. There is a exophytic lesion of indeterminate density seen in the upper 

pole of the left kidney measuring 1 cm without interval change. Continued 

imaging surveillance is recommended.



4. Severe atherosclerotic calcifications are noted in the coronary 

arteries.



The findings were discussed with Dr. Laureano at 11:45 PM. 



Dictated by Dean Ledesma MD @ 9/24/2020 11:45:53 PM



Please note that all CT scans at this facility use dose modulation, 

iterative reconstruction, and/or weight-based dosing when appropriate to 

reduce radiation dose to as low as reasonably achievable.



Dictated by: Dean Ledesma MD @ 09/24/2020 23:46:19



(Electronically Signed)

## 2020-09-25 VITALS — DIASTOLIC BLOOD PRESSURE: 72 MMHG | SYSTOLIC BLOOD PRESSURE: 145 MMHG | HEART RATE: 69 BPM

## 2021-01-03 ENCOUNTER — HOSPITAL ENCOUNTER (EMERGENCY)
Dept: HOSPITAL 56 - MW.ED | Age: 86
LOS: 1 days | Discharge: NURSING, REHAB OR LONG-TERM CARE | End: 2021-01-04
Payer: MEDICARE

## 2021-01-03 VITALS — SYSTOLIC BLOOD PRESSURE: 133 MMHG | DIASTOLIC BLOOD PRESSURE: 107 MMHG

## 2021-01-03 DIAGNOSIS — K85.90: ICD-10-CM

## 2021-01-03 DIAGNOSIS — Z79.899: ICD-10-CM

## 2021-01-03 DIAGNOSIS — Z79.02: ICD-10-CM

## 2021-01-03 DIAGNOSIS — A41.9: Primary | ICD-10-CM

## 2021-01-03 DIAGNOSIS — Z79.82: ICD-10-CM

## 2021-01-03 DIAGNOSIS — G20: ICD-10-CM

## 2021-01-03 DIAGNOSIS — I10: ICD-10-CM

## 2021-01-03 DIAGNOSIS — E03.9: ICD-10-CM

## 2021-01-03 DIAGNOSIS — J69.0: ICD-10-CM

## 2021-01-03 DIAGNOSIS — K40.90: ICD-10-CM

## 2021-01-03 DIAGNOSIS — Z88.8: ICD-10-CM

## 2021-01-03 DIAGNOSIS — R79.89: ICD-10-CM

## 2021-01-03 DIAGNOSIS — Z88.1: ICD-10-CM

## 2021-01-03 DIAGNOSIS — Z20.822: ICD-10-CM

## 2021-01-03 DIAGNOSIS — K56.609: ICD-10-CM

## 2021-01-03 DIAGNOSIS — R74.8: ICD-10-CM

## 2021-01-03 DIAGNOSIS — I48.91: ICD-10-CM

## 2021-01-03 DIAGNOSIS — I21.4: ICD-10-CM

## 2021-01-03 DIAGNOSIS — N17.9: ICD-10-CM

## 2021-01-03 LAB
BUN SERPL-MCNC: 44 MG/DL (ref 7–18)
CHLORIDE SERPL-SCNC: 93 MMOL/L (ref 98–107)
CO2 SERPL-SCNC: 23.3 MMOL/L (ref 21–32)
GLUCOSE SERPL-MCNC: 254 MG/DL (ref 74–106)
LIPASE SERPL-CCNC: 1105 U/L (ref 73–393)
POTASSIUM SERPL-SCNC: 4.2 MMOL/L (ref 3.5–5.1)
SODIUM SERPL-SCNC: 136 MMOL/L (ref 136–148)

## 2021-01-03 PROCEDURE — 43752 NASAL/OROGASTRIC W/TUBE PLMT: CPT

## 2021-01-03 PROCEDURE — 80053 COMPREHEN METABOLIC PANEL: CPT

## 2021-01-03 PROCEDURE — 87040 BLOOD CULTURE FOR BACTERIA: CPT

## 2021-01-03 PROCEDURE — 83690 ASSAY OF LIPASE: CPT

## 2021-01-03 PROCEDURE — 83735 ASSAY OF MAGNESIUM: CPT

## 2021-01-03 PROCEDURE — 51702 INSERT TEMP BLADDER CATH: CPT

## 2021-01-03 PROCEDURE — 36415 COLL VENOUS BLD VENIPUNCTURE: CPT

## 2021-01-03 PROCEDURE — 99285 EMERGENCY DEPT VISIT HI MDM: CPT

## 2021-01-03 PROCEDURE — 96366 THER/PROPH/DIAG IV INF ADDON: CPT

## 2021-01-03 PROCEDURE — 71045 X-RAY EXAM CHEST 1 VIEW: CPT

## 2021-01-03 PROCEDURE — 96375 TX/PRO/DX INJ NEW DRUG ADDON: CPT

## 2021-01-03 PROCEDURE — U0002 COVID-19 LAB TEST NON-CDC: HCPCS

## 2021-01-03 PROCEDURE — 96365 THER/PROPH/DIAG IV INF INIT: CPT

## 2021-01-03 PROCEDURE — 84484 ASSAY OF TROPONIN QUANT: CPT

## 2021-01-03 PROCEDURE — 85025 COMPLETE CBC W/AUTO DIFF WBC: CPT

## 2021-01-03 PROCEDURE — 74176 CT ABD & PELVIS W/O CONTRAST: CPT

## 2021-01-03 PROCEDURE — 83605 ASSAY OF LACTIC ACID: CPT

## 2021-01-03 PROCEDURE — 93005 ELECTROCARDIOGRAM TRACING: CPT

## 2021-01-03 NOTE — CR
INDICATION: Nausea, vomiting, hypoxia, nasogastric tube placement



TECHNIQUE: Chest radiograph 1 view



COMPARISON: 07/22/2020



FINDINGS: 



Mediastinum: Previous median sternotomy and coronary artery bypass grafting 

(CABG) noted. The heart silhouette is normal in size and morphology. NG 

tube is present in the stomach with the tip coiled and directed towards the 

GE junction. 



Lung: Small lung volumes are present mild atelectasis seen in lung base. 

There is a stable right perihilar granuloma present measuring 7 mm. The 

right apex is partially excluded. No sign of pleural effusion seen. No 

pneumothorax is identified. 



Bone and Soft tissue: Unremarkable for age. 



IMPRESSIONS: 



1. Small lung volumes are present mild atelectasis seen in lung base.



2. NG tube is present in the stomach with the tip coiled and directed 

towards the GE junction.



Dictated by Dean Ledesma MD @ 1/3/2021 10:12:48 PM



Dictated by: Dean Ledesma MD @ 01/03/2021 22:12:50



(Electronically Signed)

## 2021-01-03 NOTE — CT
INDICATION:



Nausea and vomiting 



TECHNIQUE:



CT abdomen and pelvis without contrast.



COMPARISON:



September 24, 2020 



FINDINGS:



Lower chest: Calcified granuloma in the right upper lobe. There is some 

patchy infiltrate in the lingula and left lower lobe. There is some 

compressive atelectasis in the left lower lobe from elevation of left 

hemidiaphragm. Cardiomegaly. Coronary artery calcifications. Status post 

median sternotomy. 



Liver: 1.6 cm cyst at the hepatic dome. 



Spleen: Unremarkable.  



Pancreas: Unremarkable.  



Gallbladder and bile ducts: Unremarkable.  



Adrenal glands: Unremarkable.  



Kidneys: 1.2 cm lesion on the posterior aspect of the left kidney measuring 

82 Hounsfield units in density. 



GI tract: A gastric tube tip terminates at the gastric fundus. Left lower 

quadrant colostomy. Multiple loops of dilated, fluid-filled small bowel 

measuring up to 3.9 cm. There is a fluid-filled loop of small bowel within 

a left inguinal hernia. It is difficult to determine if this is the site of 

transition as no decompressed distal small bowel loops are clearly 

identified. 



Vascular structures: Moderate atherosclerotic calcification. 



Lymph nodes: Unremarkable.  



Miscellaneous:  Unremarkable.  No free air or significant free fluid.  



Pelvic Organs:  Unremarkable.  



Bones:  Unremarkable for age.  



IMPRESSION:



Small-bowel obstruction. There is a small-bowel loop in a left inguinal 

hernia but it is difficult to determine if this is a site of transition as 

noted decompressed distal small bowel loops are clearly identified. 



Coronary artery disease. 



Patchy infiltrate in the lingula and left lower lobe concerning for 

infection. There is also some compressive atelectasis in the left lower 

lobe. 



 



Indeterminate left renal lesion. Consider renal CT for further 

characterization. 



Left lower quadrant colostomy.



Please note that all CT scans at this facility use dose modulation, 

iterative reconstruction, and/or weight-based dosing when appropriate to 

reduce radiation dose to as low as reasonably achievable.



Dictated by Karon Ralph MD @ [facility]  3 2021 10:09PM



Signed by Dr. Karon Ralph @ [facility]  3 2021 10:34PM Alert and oriented, no focal deficits, no motor or sensory deficits.

## 2021-01-03 NOTE — EDM.PDOC
ED HPI GENERAL MEDICAL PROBLEM





- General


Chief Complaint: General


Stated Complaint: VOMITTING, LOW OXYGEN


Time Seen by Provider: 01/03/21 20:59





- History of Present Illness


INITIAL COMMENTS - FREE TEXT/NARRATIVE: 





HISTORY AND PHYSICAL:





History of present illness:


This is an 85-year-old gentleman with history significant for sigmoid volvulus 

that was recently treated at [facility] resulting in a bowel resection and 

colostomy who presents ER today from [place] secondary to increased confusion, 

low blood pressure, vomiting, and hypoxia.  Patient does have a history 

significant for Parkinson's disease with visual hallucinations but at baseline 

is able to converse and is generally alert awake and oriented x3.  Upon arrival 

to the ED, patient was noted by EMS to be hypoxic on room air but responded well

to 100% nonrebreather.  No IV was obtained prior to arrival.  Patient's blood 

pressure was noted to be 55 by palp.





No recent fevers, shakes, chills, no change in stool output, no cough, no chest 

pain.  Patient is only complaining of abdominal discomfort at this time.  

Patient reports that he did eat dinner earlier today and has been vomiting up 

chunks of his dinner since.





Review of systems: 


As per history of present illness and below otherwise all systems reviewed and 

negative.





Past medical history: 


As per history of present illness and as reviewed below otherwise 

noncontributory.





Surgical history: 


As per history of present illness and as reviewed below otherwise 

noncontributory.





Social history: 


No reported history of drug or alcohol abuse.





Family history: 


As per history of present illness and as reviewed below otherwise 

noncontributory.





Physical exam:


Constitutional: Patient is oriented to person, place, and time.  Appears well-

developed and well-nourished.  No distress.


 HEENT: Moist mucous membranes


 Head: Normocephalic and atraumatic


 Eyes: Right eye exhibits no discharge.  Left eye exhibits no discharge.  No 

scleral icterus


 Neck: Normal range of motion.  No tracheal deviation present.


 Cardiovascular: Normal rate and regular rhythm.


 Pulmonary: Effort normal, no respiratory distress.


Abd: Firm, distended, no rebound/guarding, no psoas or obturator signs, no 

tenderness at Mcberney's point, no Gonzáles's sign, colostomy bag in place.  Pt 

does not present with an exam that would be consistent with an acute surgical 

abdomen at this time.  Patient has diffuse tenderness throughout.


 Musculoskeletal: Normal range of motion


 Neurologic: Alert and oriented to person, place and time.  Patient is extremely

soft-spoken and hard of hearing.  Patient was able to relay to me today's month 

and year, he has recognized his niece who he has not seen in quite some time, he

knows his name, he knows he is in the ER, he knows that he lives at [place].


: Holly catheter attempted and unclear if successful.  No urinary output has 

been obtained.  It is unclear whether or not this is secondary to poor placement

versus his acute renal failure.  We will remove the catheter as at this time, U

TI appears to be less likely to cause and his CT scan does not reveal a 

moderately distended gallbladder.  Patient does have an easily reducible left 

inguinal hernia noted on examination.  It does not appear to be incarcerated or 

strangulated. 


 Skin: Pink, warm and dry.  


 Psychiatric: Flat affect


 Nursing note and vital signs have been reviewed





This patient was seen and evaluated during the 2020 SARS-CoV-2 novel coronavirus

pandemic period.  Community viral transmission is ongoing at time of this 

encounter and the emergency department is operating under pandemic response 

procedures.








Diagnostics:


CT the abdomen pelvis:


1.  Small bowel obstruction.  There is a small bowel loop and a left inguinal 

hernia but it is difficult to determine if this is a site of transition is noted

decompressed distal small bowel loops are clearly identified.(Of note, on 

clinical exam, patient does have a left inguinal hernia that is easily 

reducible.  Unlikely that this is the cause of the small bowel obstruction.)


2.  Patchy infiltrate in the lingula and left lower lobe concerning for 

infection.  There is also some compressive atelectasis in the left lower lobe. 

(Of note, patient had multiple episodes of emesis and concern for aspiration 

pneumonia is extremely high on the list.  Patient has been started on Zosyn 

empirically.)





Patient's labs were significant for a markedly elevated WBC count with a left 

shift.


Patient's troponin is elevated 0.  097.


Patient's lipase is elevated at approximately 1100.


Patient's BUN and creatinine are elevated with a BUN of 44 and 3.1.  This is new

for the patient is it appears at his baseline creatinine here in September was 

0.7.


Patient has a lactic acid level of 8.





EKG:


As interpreted by ER physician: Theodora:


Nonspecific ST-T wave abnormalities


Normal axis


No evidence of ST elevation MI


Atrial fibrillation with a rate of 78











Therapeutics:


NG tube placed by ER MD without difficulty.  Approximate 1.5 L of bilious 

material has been obtained with some relief in patient's discomfort.


Zofran 4 mg IV


NSS wide open x1 L followed by NSS at 100 mL's per hour








Assessment and plan:


Is an 85-year-old gentleman with a history significant for Parkinson's, sigmoid 

volvulus requiring bowel resection who is status post colostomy bag in [facility] back in October 2020.  Patient has multiple comorbidities.  Patient 

presents to the ER today secondary to small bowel obstruction with unclear 

transition point.  Patient is status post surgery so adhesions are highly likely

in the list.  Patient does have a history of a right inguinal large bowel 

incarcerated hernia however at this time, no hernia is appreciable.  Patient 

does have an elevated lactic acid level, elevated WBC count with a significant 

left shift, chest x-ray that is consistent with possible lingular and left lower

lobe infiltrates





I have had a long discussion with the patient's prior , his sister 

regarding patient's prognosis and possible need for surgery.  At this time, the 

patient's sister feels that the patient is competent to make decisions on his 

own.  Patient is able to answer questions appropriately but he does have 

difficult time communicating secondary to an extremely low voice and difficult 

to understand him.  After an extremely long conversation utilizing his sister 

and myself it appears that the patient does not wish to be placed on comfort 

care only and is wishing for surgery if it could ultimately result in prolonging

his life.  He is requesting that we transfer him to [facility] where he had 

his prior surgery.





I have discussed the case with Dr. Foreman at [facility] and he has agreed to

assist us with transfer this patient and have surgical consultation.





Critical Care: The high probability of sudden, clinically significant 

deterioration in the patient's condition required the highest level of my 

preparedness to intervene urgently. The services I provided to this patient were

to treat and/or prevent clinically significant deterioration. Services included 

the following: chart data review, reviewing nursing notes and/or old charts, 

documentation time, consultant collaboration regarding findings and treatment 

options, medication orders and management, direct patient care, vital sign 

assessments and ordering, interpreting and reviewing diagnostic studies/lab 

tests. Aggregate critical care time includes only time during which I was 

engaged inwork directly related to the patient's care, as described above, 

whether at the bedside or elsewhere in the Emergency Department.  It did not 

include time spent performing other reported procedures or the services of 

residents, students, nurses or physician assistants.














Critical Care Time:  35 minutes


Patient does have signs symptoms concerning for sepsis given his elevated WBC co

unt, elevated lactic acid level, pneumonia on x-ray, patient has been started on

Zosyn.  Patient has been ordered for 30 cc/kg of NSS.  Patient has received 2 L 

in the ED already.  Patient weighs 57 kg which would equate to approximately 1.8

L of NSS.





Definitive disposition and diagnosis as appropriate pending reevaluation and 

review of above.











- Related Data


                                    Allergies











Allergy/AdvReac Type Severity Reaction Status Date / Time


 


atorvastatin Allergy  Other Verified 01/03/21 21:01


 


fluticasone [From Flonase] Allergy  Other Verified 01/03/21 21:01


 


fluvastatin Allergy  Other Verified 01/03/21 21:01


 


niacin Allergy  Muscle Verified 01/03/21 21:01





   Weakness  


 


simvastatin Allergy  Chest Pain Verified 01/03/21 21:01


 


tamsulosin Allergy  Other Verified 01/03/21 21:01











Home Meds: 


                                    Home Meds





Latanoprost [Xalatan 0.005% Ophth Soln] 1 drop EYEBOTH BEDTIME 12/22/16 

[History]


Lisinopril 40 mg PO DAILY 12/22/16 [History]


Metoprolol Tartrate 12.5 mg PO BID 12/22/16 [History]


Multivitamin [Multivitamins] 1 each PO DAILY 12/22/16 [History]


Nitroglycerin 9 mg PO Q12HR 12/22/16 [History]


Lovastatin 40 mg PO BEDTIME 12/24/16 [History]


Acetaminophen [Pain Reliever] 650 mg PO Q4H PRN 02/17/17 [History]


Aspirin [Ecotrin EC] 81 mg PO DAILY 02/17/17 [History]


Calcium Carbonate/Vitamin D3 [Calcium 600 + Vit D Tablet] 1 tab PO DAILY 

02/17/17 [History]


Levothyroxine 25 mcg PO ACBREAKFAST 02/17/17 [History]


Carbamide Peroxide [Debrox] 1 drop EARBOTH ASDIRECTED PRN 09/24/20 [History]


Clopidogrel Bisulfate [Plavix] 75 mg PO DAILY 09/24/20 [History]


Carbamide Peroxide [Debrox] 3 drop EARBOTH DAILY 01/03/21 [History]


Docusate Sodium 100 mg PO BID 01/03/21 [History]


Donepezil HCl 10 mg PO DAILY 01/03/21 [History]


Finasteride 5 mg PO DAILY 01/03/21 [History]


Pimavanserin Tartrate [Nuplazid] 34 mg PO DAILY 01/03/21 [History]


amLODIPine Besylate [Norvasc] 2.5 mg PO DAILY 01/03/21 [History]


polyethylene glycoL 3350 [MiraLAX] 17 gm PO DAILY 01/03/21 [History]











Past Medical History


HEENT History: Reports: Glaucoma


Cardiovascular History: Reports: Hypertension, Stents


Respiratory History: Reports: None


Gastrointestinal History: Reports: Chronic Constipation, Other (See Below)


Other Gastrointestinal History: bowel obstruction


Genitourinary History: Reports: Prostate Disorder


Musculoskeletal History: Reports: None


Neurological History: Reports: Other (See Below)


Other Neuro History: Confusion


Psychiatric History: Reports: Anxiety, Depression


Endocrine/Metabolic History: Reports: Hypothyroidism


Insulin Pump Model and : None


Hematologic History: Reports: None


Immunologic History: Reports: None


Oncologic (Cancer) History: Reports: Prostate


Dermatologic History: Reports: None





- Infectious Disease History


Infectious Disease History: Reports: Chicken Pox, Measles, Mumps





- Past Surgical History


Head Surgeries/Procedures: Reports: None


Cardiovascular Surgical History: Reports: Coronary Artery Bypass


GI Surgical History: Reports: Colostomy





Social & Family History





- Family History


Family Medical History: Unobtainable





- Tobacco Use


Tobacco Use Status *Q: Never Tobacco User





- Caffeine Use


Caffeine Use: Reports: None





- Recreational Drug Use


Recreational Drug Use: No





ED ROS GENERAL





- Review of Systems


Review Of Systems: See Below





ED EXAM, GENERAL





- Physical Exam


Exam: See Below


  ** #1 Interpretation


EKG Interpretation Comments: 





EKG:


As interpreted by ER physician: Theodora:


Nonspecific ST-T wave abnormalities


Normal axis


No evidence of ST elevation MI


Atrial fibrillation with a rate of 78








Course





- Vital Signs


Last Recorded V/S: 


                                Last Vital Signs











Temp  96.8 F L  01/03/21 20:55


 


Pulse  108 H  01/03/21 20:55


 


Resp  24 H  01/03/21 20:55


 


BP  133/107 H  01/03/21 20:55


 


Pulse Ox  76 L  01/03/21 20:55














- Orders/Labs/Meds


Orders: 


                               Active Orders 24 hr











 Category Date Time Status


 


 Cardiac Monitoring [RC] .AS DIRECTED Care  01/03/21 21:00 Active


 


 CORONAVIRUS COVID-19 RICKY [MOLEC] Stat Lab  01/03/21 22:43 Received


 


 CULTURE BLOOD [BC] Stat Lab  01/03/21 21:12 Results


 


 CULTURE BLOOD [BC] Stat Lab  01/03/21 22:30 Results


 


 UA W/JEFF RFLX IF INDICATED [URIN] Stat Lab  01/03/21 21:01 Ordered


 


 Piperacillin/Tazobactam [Piperacil-Tazobact] 4.5 gm Med  01/03/21 23:04 Ordered





 Sodium Chloride 0.9% [Normal Saline] 100 ml   





 IV ONETIME   


 


 Sodium Chloride 0.9% [Normal Saline] 1,000 ml Med  01/03/21 22:45 Active





 IV ASDIRECTED   


 


 Sodium Chloride 0.9% [Saline Flush] Med  01/03/21 21:00 Active





 10 ml FLUSH ASDIRECTED PRN   


 


 Sodium Chloride 0.9% [Saline Flush] Med  01/03/21 21:00 Active





 2.5 ml FLUSH ASDIRECTED PRN   


 


 Blood Culture x2 Reflex Set [OM.PC] Stat Oth  01/03/21 22:20 Ordered


 


 Saline Lock Insert [OM.PC] Stat Oth  01/03/21 21:00 Ordered








                                Medication Orders





Sodium Chloride (Normal Saline)  1,000 mls @ 125 mls/hr IV ASDIRECTED CRICKET


   Last Admin: 01/03/21 22:53  Dose: 125 mls/hr


   Documented by: MILES


Piperacillin Sod/Tazobactam (Sod 4.5 gm/ Sodium Chloride)  100 mls @ 100 mls/hr 

IV ONETIME ONE


   Stop: 01/04/21 00:03


Sodium Chloride (Saline Flush)  10 ml FLUSH ASDIRECTED PRN


   PRN Reason: Keep Vein Open


   Last Admin: 01/03/21 21:07  Dose: 10 ml


   Documented by: MIGUEL


Sodium Chloride (Saline Flush)  2.5 ml FLUSH ASDIRECTED PRN


   PRN Reason: Keep Vein Open


   Last Admin: 01/03/21 21:07  Dose: 2.5 ml


   Documented by: MIGUEL








Labs: 


                                Laboratory Tests











  01/03/21 01/03/21 01/03/21 Range/Units





  20:58 20:58 21:12 


 


WBC  20.82 H    (4.0-11.0)  K/uL


 


RBC  4.97    (4.50-5.90)  M/uL


 


Hgb  15.0    (13.0-17.0)  g/dL


 


Hct  46.5    (38.0-50.0)  %


 


MCV  93.6    (80.0-98.0)  fL


 


MCH  30.2    (27.0-32.0)  pg


 


MCHC  32.3    (31.0-37.0)  g/dL


 


RDW Std Deviation  43.2    (28.0-62.0)  fl


 


RDW Coeff of Yinka  13    (11.0-15.0)  %


 


Plt Count  257    (150-400)  K/uL


 


MPV  9.90    (7.40-12.00)  fL


 


Neut % (Auto)  91.8 H    (48.0-80.0)  %


 


Lymph % (Auto)  4.9 L    (16.0-40.0)  %


 


Mono % (Auto)  3.2    (0.0-15.0)  %


 


Eos % (Auto)  0.0    (0.0-7.0)  %


 


Baso % (Auto)  0.1    (0.0-1.5)  %


 


Neut # (Auto)  19.1 H    (1.4-5.7)  K/uL


 


Lymph # (Auto)  1.0    (0.6-2.4)  K/uL


 


Mono # (Auto)  0.7    (0.0-0.8)  K/uL


 


Eos # (Auto)  0.0    (0.0-0.7)  K/uL


 


Baso # (Auto)  0.0    (0.0-0.1)  K/uL


 


Nucleated RBC %  0.0    /100WBC


 


Nucleated RBCs #  0    K/uL


 


Lactate    8.2 H*  (0.20-2.00)  mmol/L


 


Sodium   136   (136-148)  mmol/L


 


Potassium   4.2   (3.5-5.1)  mmol/L


 


Chloride   93 L   ()  mmol/L


 


Carbon Dioxide   23.3   (21.0-32.0)  mmol/L


 


BUN   44 H   (7.0-18.0)  mg/dL


 


Creatinine   3.1 H   (0.8-1.3)  mg/dL


 


Est Cr Clr Drug Dosing   TNP   


 


Estimated GFR (MDRD)   19.3   ml/min


 


Glucose   254 H   ()  mg/dL


 


Calcium   11.3 H   (8.5-10.1)  mg/dL


 


Magnesium   3.0 H   (1.8-2.4)  mg/dL


 


Total Bilirubin   0.9   (0.2-1.0)  mg/dL


 


AST   24   (15-37)  IU/L


 


ALT   26   (14-63)  IU/L


 


Alkaline Phosphatase   126 H   ()  U/L


 


Troponin I   0.097 H*   (0.000-0.056)  ng/mL


 


Total Protein   9.3 H   (6.4-8.2)  g/dL


 


Albumin   4.8   (3.4-5.0)  g/dL


 


Globulin   4.5 H   (2.6-4.0)  g/dL


 


Albumin/Globulin Ratio   1.1   (0.9-1.6)  


 


Lipase   1105 H   ()  U/L











Meds: 


Medications











Generic Name Dose Route Start Last Admin





  Trade Name Freq  PRN Reason Stop Dose Admin


 


Sodium Chloride  1,000 mls @ 125 mls/hr  01/03/21 22:45  01/03/21 22:53





  Normal Saline  IV   125 mls/hr





  ASDIRECTED CRICKET   Administration


 


Piperacillin Sod/Tazobactam  100 mls @ 100 mls/hr  01/03/21 23:04 





  Sod 4.5 gm/ Sodium Chloride  IV  01/04/21 00:03 





  ONETIME ONE  


 


Sodium Chloride  10 ml  01/03/21 21:00  01/03/21 21:07





  Saline Flush  FLUSH   10 ml





  ASDIRECTED PRN   Administration





  Keep Vein Open  


 


Sodium Chloride  2.5 ml  01/03/21 21:00  01/03/21 21:07





  Saline Flush  FLUSH   2.5 ml





  ASDIRECTED PRN   Administration





  Keep Vein Open  














Discontinued Medications














Generic Name Dose Route Start Last Admin





  Trade Name Freq  PRN Reason Stop Dose Admin


 


Sodium Chloride  1,000 mls @ 999 mls/hr  01/03/21 21:00  01/03/21 21:07





  Normal Saline  IV  01/03/21 22:00  999 mls/hr





  .Bolus ONE   Administration


 


Lidocaine HCl  Confirm  01/03/21 21:21  01/03/21 21:43





  Xylocaine 2% Viscous  Administered  01/03/21 21:22  Not Given





  Dose  





  15 ml  





  .ROUTE  





  .STK-MED ONE  


 


Lidocaine HCl  15 ml  01/03/21 21:43  01/03/21 21:21





  Xylocaine 2% Viscous  PO  01/03/21 21:44  15 ml





  ONETIME ONE   Administration


 


Lidocaine HCl  15 ml  01/03/21 22:20  01/03/21 22:42





  Xylocaine 2% Viscous  PO  01/03/21 22:21  15 ml





  ONETIME ONE   Administration


 


Lidocaine HCl  Confirm  01/03/21 22:20  01/03/21 22:28





  Xylocaine 2% Viscous  Administered  01/03/21 22:21  Not Given





  Dose  





  15 ml  





  .ROUTE  





  .STK-MED ONE  


 


Ondansetron HCl  Confirm  01/03/21 20:55  01/03/21 21:08





  Zofran  Administered  01/03/21 20:56  Not Given





  Dose  





  4 mg  





  .ROUTE  





  .STK-MED ONE  


 


Ondansetron HCl  4 mg  01/03/21 21:00  01/03/21 21:07





  Zofran  IVPUSH  01/03/21 21:01  4 mg





  ONETIME ONE   Administration














Departure





- Departure


Time of Disposition: 23:22


Disposition: DC/[facility]fer to [facility]


Condition: Fair


Clinical Impression: 


 Non-STEMI (non-ST elevated myocardial infarction), Small bowel obstruction, 

Pancreatitis, Acute renal failure, Sepsis, Aspiration pneumonia, Inguinal 

hernia, Elevated troponin, Parkinsons disease








- Discharge Information


Referrals: 


Jaret Lemons [Primary Care Provider] - 


Forms:  ED Department Discharge





Sepsis Event Note (ED)





- Evaluation


Sepsis Screening Result: No Definite Risk





- Focused Exam


Vital Signs: 


                                   Vital Signs











  Temp Pulse Resp BP Pulse Ox


 


 01/03/21 20:55  96.8 F L  108 H  24 H  133/107 H  76 L














- My Orders


Last 24 Hours: 


My Active Orders





01/03/21 21:00


Cardiac Monitoring [RC] .AS DIRECTED 


Sodium Chloride 0.9% [Saline Flush]   10 ml FLUSH ASDIRECTED PRN 


Sodium Chloride 0.9% [Saline Flush]   2.5 ml FLUSH ASDIRECTED PRN 


Saline Lock Insert [OM.PC] Stat 





01/03/21 21:01


UA W/JEFF RFLX IF INDICATED [URIN] Stat 





01/03/21 21:12


CULTURE BLOOD [BC] Stat 





01/03/21 22:20


Blood Culture x2 Reflex Set [OM.PC] Stat 





01/03/21 22:30


CULTURE BLOOD [BC] Stat 





01/03/21 22:43


CORONAVIRUS COVID-19 RICKY [MOLEC] Stat 





01/03/21 22:45


Sodium Chloride 0.9% [Normal Saline] 1,000 ml IV ASDIRECTED 





01/03/21 23:04


Piperacillin/Tazobactam [Piperacil-Tazobact] 4.5 gm   Sodium Chloride 0.9% 

[Normal Saline] 100 ml IV ONETIME 














- Assessment/Plan


Last 24 Hours: 


My Active Orders





01/03/21 21:00


Cardiac Monitoring [RC] .AS DIRECTED 


Sodium Chloride 0.9% [Saline Flush]   10 ml FLUSH ASDIRECTED PRN 


Sodium Chloride 0.9% [Saline Flush]   2.5 ml FLUSH ASDIRECTED PRN 


Saline Lock Insert [OM.PC] Stat 





01/03/21 21:01


UA W/JEFF RFLX IF INDICATED [URIN] Stat 





01/03/21 21:12


CULTURE BLOOD [BC] Stat 





01/03/21 22:20


Blood Culture x2 Reflex Set [OM.PC] Stat 





01/03/21 22:30


CULTURE BLOOD [BC] Stat 





01/03/21 22:43


CORONAVIRUS COVID-19 RICKY [MOLEC] Stat 





01/03/21 22:45


Sodium Chloride 0.9% [Normal Saline] 1,000 ml IV ASDIRECTED 





01/03/21 23:04


Piperacillin/Tazobactam [Piperacil-Tazobact] 4.5 gm   Sodium Chloride 0.9% 

[Normal Saline] 100 ml IV ONETIME

## 2021-01-04 VITALS — HEART RATE: 73 BPM
